# Patient Record
Sex: FEMALE | Race: WHITE | Employment: OTHER | URBAN - METROPOLITAN AREA
[De-identification: names, ages, dates, MRNs, and addresses within clinical notes are randomized per-mention and may not be internally consistent; named-entity substitution may affect disease eponyms.]

---

## 2024-03-13 ENCOUNTER — HOSPITAL ENCOUNTER (OUTPATIENT)
Facility: HOSPITAL | Age: 39
Discharge: HOME | End: 2024-03-16
Payer: OTHER GOVERNMENT

## 2024-03-13 DIAGNOSIS — Z01.818 PRE-OP TESTING: Primary | ICD-10-CM

## 2024-03-13 LAB
ANION GAP SERPL CALC-SCNC: 6 MMOL/L (ref 3–18)
APTT PPP: 30.7 SEC (ref 23–36.4)
BUN SERPL-MCNC: 13 MG/DL (ref 7–18)
BUN/CREAT SERPL: 14 (ref 12–20)
CALCIUM SERPL-MCNC: 9.4 MG/DL (ref 8.5–10.1)
CHLORIDE SERPL-SCNC: 102 MMOL/L (ref 100–111)
CO2 SERPL-SCNC: 31 MMOL/L (ref 21–32)
CREAT SERPL-MCNC: 0.9 MG/DL (ref 0.6–1.3)
EKG ATRIAL RATE: 73 BPM
EKG DIAGNOSIS: NORMAL
EKG P AXIS: 29 DEGREES
EKG P-R INTERVAL: 136 MS
EKG Q-T INTERVAL: 396 MS
EKG QRS DURATION: 84 MS
EKG QTC CALCULATION (BAZETT): 436 MS
EKG R AXIS: 90 DEGREES
EKG T AXIS: 64 DEGREES
EKG VENTRICULAR RATE: 73 BPM
ERYTHROCYTE [DISTWIDTH] IN BLOOD BY AUTOMATED COUNT: 12.3 % (ref 11.6–14.5)
GLUCOSE SERPL-MCNC: 85 MG/DL (ref 74–99)
HCT VFR BLD AUTO: 43.6 % (ref 35–45)
HGB BLD-MCNC: 14.7 G/DL (ref 12–16)
INR PPP: 0.9 (ref 0.9–1.1)
MCH RBC QN AUTO: 30.2 PG (ref 24–34)
MCHC RBC AUTO-ENTMCNC: 33.7 G/DL (ref 31–37)
MCV RBC AUTO: 89.7 FL (ref 78–100)
NRBC # BLD: 0 K/UL (ref 0–0.01)
NRBC BLD-RTO: 0 PER 100 WBC
PLATELET # BLD AUTO: 246 K/UL (ref 135–420)
PMV BLD AUTO: 11.3 FL (ref 9.2–11.8)
POTASSIUM SERPL-SCNC: 4.1 MMOL/L (ref 3.5–5.5)
PROTHROMBIN TIME: 12.7 SEC (ref 11.9–14.7)
RBC # BLD AUTO: 4.86 M/UL (ref 4.2–5.3)
SODIUM SERPL-SCNC: 139 MMOL/L (ref 136–145)
WBC # BLD AUTO: 9.9 K/UL (ref 4.6–13.2)

## 2024-03-13 PROCEDURE — 85730 THROMBOPLASTIN TIME PARTIAL: CPT

## 2024-03-13 PROCEDURE — 93005 ELECTROCARDIOGRAM TRACING: CPT | Performed by: SURGERY

## 2024-03-13 PROCEDURE — 85610 PROTHROMBIN TIME: CPT

## 2024-03-13 PROCEDURE — 80048 BASIC METABOLIC PNL TOTAL CA: CPT

## 2024-03-13 PROCEDURE — 85027 COMPLETE CBC AUTOMATED: CPT

## 2024-03-20 ENCOUNTER — ANESTHESIA EVENT (OUTPATIENT)
Facility: HOSPITAL | Age: 39
End: 2024-03-20
Payer: OTHER GOVERNMENT

## 2024-03-21 ENCOUNTER — HOSPITAL ENCOUNTER (OUTPATIENT)
Facility: HOSPITAL | Age: 39
Setting detail: OUTPATIENT SURGERY
Discharge: HOME | End: 2024-03-21
Attending: SURGERY | Admitting: SURGERY
Payer: OTHER GOVERNMENT

## 2024-03-21 ENCOUNTER — ANESTHESIA (OUTPATIENT)
Facility: HOSPITAL | Age: 39
End: 2024-03-21
Payer: OTHER GOVERNMENT

## 2024-03-21 ENCOUNTER — APPOINTMENT (OUTPATIENT)
Facility: HOSPITAL | Age: 39
End: 2024-03-21
Attending: SURGERY
Payer: OTHER GOVERNMENT

## 2024-03-21 VITALS
HEART RATE: 82 BPM | DIASTOLIC BLOOD PRESSURE: 74 MMHG | RESPIRATION RATE: 15 BRPM | TEMPERATURE: 97.8 F | OXYGEN SATURATION: 100 % | BODY MASS INDEX: 41.59 KG/M2 | HEIGHT: 63 IN | WEIGHT: 234.7 LBS | SYSTOLIC BLOOD PRESSURE: 132 MMHG

## 2024-03-21 DIAGNOSIS — R15.9 FULL INCONTINENCE OF FECES: Primary | ICD-10-CM

## 2024-03-21 LAB — HCG UR QL: NEGATIVE

## 2024-03-21 PROCEDURE — 6360000002 HC RX W HCPCS: Performed by: SURGERY

## 2024-03-21 PROCEDURE — 7100000000 HC PACU RECOVERY - FIRST 15 MIN: Performed by: SURGERY

## 2024-03-21 PROCEDURE — 2580000003 HC RX 258: Performed by: NURSE ANESTHETIST, CERTIFIED REGISTERED

## 2024-03-21 PROCEDURE — 6360000002 HC RX W HCPCS: Performed by: NURSE ANESTHETIST, CERTIFIED REGISTERED

## 2024-03-21 PROCEDURE — C1787 PATIENT PROGR, NEUROSTIM: HCPCS | Performed by: SURGERY

## 2024-03-21 PROCEDURE — C1767 GENERATOR, NEURO NON-RECHARG: HCPCS | Performed by: SURGERY

## 2024-03-21 PROCEDURE — 81025 URINE PREGNANCY TEST: CPT

## 2024-03-21 PROCEDURE — 3700000001 HC ADD 15 MINUTES (ANESTHESIA): Performed by: SURGERY

## 2024-03-21 PROCEDURE — 77002 NEEDLE LOCALIZATION BY XRAY: CPT

## 2024-03-21 PROCEDURE — C1897 LEAD, NEUROSTIM TEST KIT: HCPCS | Performed by: SURGERY

## 2024-03-21 PROCEDURE — 3600000002 HC SURGERY LEVEL 2 BASE: Performed by: SURGERY

## 2024-03-21 PROCEDURE — C1778 LEAD, NEUROSTIMULATOR: HCPCS | Performed by: SURGERY

## 2024-03-21 PROCEDURE — 7100000010 HC PHASE II RECOVERY - FIRST 15 MIN: Performed by: SURGERY

## 2024-03-21 PROCEDURE — 7100000011 HC PHASE II RECOVERY - ADDTL 15 MIN: Performed by: SURGERY

## 2024-03-21 PROCEDURE — 3700000000 HC ANESTHESIA ATTENDED CARE: Performed by: SURGERY

## 2024-03-21 PROCEDURE — 2580000003 HC RX 258: Performed by: SURGERY

## 2024-03-21 PROCEDURE — 3600000012 HC SURGERY LEVEL 2 ADDTL 15MIN: Performed by: SURGERY

## 2024-03-21 PROCEDURE — 7100000001 HC PACU RECOVERY - ADDTL 15 MIN: Performed by: SURGERY

## 2024-03-21 PROCEDURE — 2709999900 HC NON-CHARGEABLE SUPPLY: Performed by: SURGERY

## 2024-03-21 PROCEDURE — 2500000003 HC RX 250 WO HCPCS: Performed by: SURGERY

## 2024-03-21 DEVICE — NEUROSTIMULATOR INTERSTIM X RECHRGE FREE TORQ WRNCH PROD: Type: IMPLANTABLE DEVICE | Site: BACK | Status: FUNCTIONAL

## 2024-03-21 DEVICE — LEAD NERVE STIM 4.32 MM INTERSTIM SURESCAN: Type: IMPLANTABLE DEVICE | Site: BACK | Status: FUNCTIONAL

## 2024-03-21 RX ORDER — ONDANSETRON 2 MG/ML
INJECTION INTRAMUSCULAR; INTRAVENOUS PRN
Status: DISCONTINUED | OUTPATIENT
Start: 2024-03-21 | End: 2024-03-21 | Stop reason: SDUPTHER

## 2024-03-21 RX ORDER — HYDROMORPHONE HYDROCHLORIDE 1 MG/ML
0.5 INJECTION, SOLUTION INTRAMUSCULAR; INTRAVENOUS; SUBCUTANEOUS EVERY 5 MIN PRN
Status: DISCONTINUED | OUTPATIENT
Start: 2024-03-21 | End: 2024-03-21 | Stop reason: HOSPADM

## 2024-03-21 RX ORDER — SODIUM CHLORIDE 0.9 % (FLUSH) 0.9 %
5-40 SYRINGE (ML) INJECTION PRN
Status: DISCONTINUED | OUTPATIENT
Start: 2024-03-21 | End: 2024-03-21 | Stop reason: HOSPADM

## 2024-03-21 RX ORDER — SUCCINYLCHOLINE/SOD CL,ISO/PF 100 MG/5ML
SYRINGE (ML) INTRAVENOUS PRN
Status: DISCONTINUED | OUTPATIENT
Start: 2024-03-21 | End: 2024-03-21 | Stop reason: SDUPTHER

## 2024-03-21 RX ORDER — TRAMADOL HYDROCHLORIDE 50 MG/1
50 TABLET ORAL EVERY 8 HOURS PRN
Qty: 9 TABLET | Refills: 0 | Status: SHIPPED | OUTPATIENT
Start: 2024-03-21 | End: 2024-03-24

## 2024-03-21 RX ORDER — NEOMYCIN AND POLYMYXIN B SULFATES 40; 200000 MG/ML; [USP'U]/ML
SOLUTION IRRIGATION PRN
Status: DISCONTINUED | OUTPATIENT
Start: 2024-03-21 | End: 2024-03-21 | Stop reason: ALTCHOICE

## 2024-03-21 RX ORDER — SODIUM CHLORIDE, SODIUM LACTATE, POTASSIUM CHLORIDE, CALCIUM CHLORIDE 600; 310; 30; 20 MG/100ML; MG/100ML; MG/100ML; MG/100ML
INJECTION, SOLUTION INTRAVENOUS CONTINUOUS
Status: DISCONTINUED | OUTPATIENT
Start: 2024-03-21 | End: 2024-03-21 | Stop reason: HOSPADM

## 2024-03-21 RX ORDER — CHLORHEXIDINE GLUCONATE 4 G/100ML
SOLUTION TOPICAL ONCE
Status: DISCONTINUED | OUTPATIENT
Start: 2024-03-21 | End: 2024-03-21 | Stop reason: HOSPADM

## 2024-03-21 RX ORDER — PROPOFOL 10 MG/ML
INJECTION, EMULSION INTRAVENOUS PRN
Status: DISCONTINUED | OUTPATIENT
Start: 2024-03-21 | End: 2024-03-21 | Stop reason: SDUPTHER

## 2024-03-21 RX ORDER — SODIUM CHLORIDE 9 MG/ML
INJECTION, SOLUTION INTRAVENOUS PRN
Status: DISCONTINUED | OUTPATIENT
Start: 2024-03-21 | End: 2024-03-21 | Stop reason: HOSPADM

## 2024-03-21 RX ORDER — NALOXONE HYDROCHLORIDE 0.4 MG/ML
INJECTION, SOLUTION INTRAMUSCULAR; INTRAVENOUS; SUBCUTANEOUS PRN
Status: DISCONTINUED | OUTPATIENT
Start: 2024-03-21 | End: 2024-03-21 | Stop reason: HOSPADM

## 2024-03-21 RX ORDER — SODIUM CHLORIDE 0.9 % (FLUSH) 0.9 %
5-40 SYRINGE (ML) INJECTION EVERY 12 HOURS SCHEDULED
Status: DISCONTINUED | OUTPATIENT
Start: 2024-03-21 | End: 2024-03-21 | Stop reason: HOSPADM

## 2024-03-21 RX ORDER — SODIUM CHLORIDE, SODIUM LACTATE, POTASSIUM CHLORIDE, CALCIUM CHLORIDE 600; 310; 30; 20 MG/100ML; MG/100ML; MG/100ML; MG/100ML
INJECTION, SOLUTION INTRAVENOUS CONTINUOUS PRN
Status: DISCONTINUED | OUTPATIENT
Start: 2024-03-21 | End: 2024-03-21 | Stop reason: SDUPTHER

## 2024-03-21 RX ORDER — FENTANYL CITRATE 50 UG/ML
INJECTION, SOLUTION INTRAMUSCULAR; INTRAVENOUS PRN
Status: DISCONTINUED | OUTPATIENT
Start: 2024-03-21 | End: 2024-03-21 | Stop reason: SDUPTHER

## 2024-03-21 RX ORDER — MIDAZOLAM HYDROCHLORIDE 1 MG/ML
INJECTION INTRAMUSCULAR; INTRAVENOUS PRN
Status: DISCONTINUED | OUTPATIENT
Start: 2024-03-21 | End: 2024-03-21 | Stop reason: SDUPTHER

## 2024-03-21 RX ORDER — FENTANYL CITRATE 50 UG/ML
25 INJECTION, SOLUTION INTRAMUSCULAR; INTRAVENOUS EVERY 5 MIN PRN
Status: DISCONTINUED | OUTPATIENT
Start: 2024-03-21 | End: 2024-03-21 | Stop reason: HOSPADM

## 2024-03-21 RX ADMIN — FENTANYL CITRATE 100 MCG: 50 INJECTION, SOLUTION INTRAMUSCULAR; INTRAVENOUS at 07:35

## 2024-03-21 RX ADMIN — WATER 2000 MG: 1 INJECTION INTRAMUSCULAR; INTRAVENOUS; SUBCUTANEOUS at 07:57

## 2024-03-21 RX ADMIN — ONDANSETRON HYDROCHLORIDE 4 MG: 2 INJECTION INTRAMUSCULAR; INTRAVENOUS at 08:41

## 2024-03-21 RX ADMIN — PROPOFOL 200 MG: 10 INJECTION, EMULSION INTRAVENOUS at 07:35

## 2024-03-21 RX ADMIN — SODIUM CHLORIDE, SODIUM LACTATE, POTASSIUM CHLORIDE, AND CALCIUM CHLORIDE: 600; 310; 30; 20 INJECTION, SOLUTION INTRAVENOUS at 07:30

## 2024-03-21 RX ADMIN — Medication 100 MG: at 07:35

## 2024-03-21 RX ADMIN — SODIUM CHLORIDE, POTASSIUM CHLORIDE, SODIUM LACTATE AND CALCIUM CHLORIDE: 600; 310; 30; 20 INJECTION, SOLUTION INTRAVENOUS at 06:17

## 2024-03-21 RX ADMIN — MIDAZOLAM 2 MG: 1 INJECTION INTRAMUSCULAR; INTRAVENOUS at 07:30

## 2024-03-21 ASSESSMENT — PAIN SCALES - GENERAL
PAINLEVEL_OUTOF10: 0

## 2024-03-21 ASSESSMENT — PAIN - FUNCTIONAL ASSESSMENT: PAIN_FUNCTIONAL_ASSESSMENT: 0-10

## 2024-03-21 NOTE — ANESTHESIA POSTPROCEDURE EVALUATION
Department of Anesthesiology  Postprocedure Note    Patient: Priscilla David  MRN: [id number]  YOB: 1985  Date of evaluation: 3/21/2024    Procedure Summary       Date: 03/21/24 Room / Location: [facility] MAIN 03 / [facility] MAIN OR    Anesthesia Start: 0730 Anesthesia Stop: 0919    Procedure: FULL IMPLANT USING VERIFY SYSTEM TO INCLUDE INCISION FOR IMPLANTATION OF NERVE STIMULATOR OF THE SACRAL NERVE , INSERTION OF A PERIPHERAL NERVE STIMULATOR PULSE GENERATOR,  ANALYSIS OF PROFESSIONAL COMPONENT OF FLUOROSCOPY UP TO 1 HOUR, ELECTRONIC ANALYSIS OF NEUROSTIMULATOR GENERATOR W.C-ARM (Back) Diagnosis:       Incontinence of feces, unspecified fecal incontinence type      (Incontinence of feces, unspecified fecal incontinence type [R15.9])    Surgeons: Marilu Edgar DO Responsible Provider: Mandy Giordano MD    Anesthesia Type: General ASA Status: 3            Anesthesia Type: General    Josr Phase I: Josr Score: 9    Josr Phase II:      Anesthesia Post Evaluation    Patient location during evaluation: PACU  Patient participation: complete - patient participated  Level of consciousness: awake  Pain score: 0  Airway patency: patent  Nausea & Vomiting: no nausea and no vomiting  Cardiovascular status: blood pressure returned to baseline  Respiratory status: acceptable  Hydration status: stable  Multimodal analgesia pain management approach  Pain management: satisfactory to patient    No notable events documented.

## 2024-03-21 NOTE — PERIOP NOTE
TRANSFER - IN REPORT:    Verbal report received from BHUMIKA Menjivar on Priscilla David  being received from PACU for routine progression of patient care      Report consisted of patient's Situation, Background, Assessment and   Recommendations(SBAR).     Information from the following report(s) Intake/Output and MAR was reviewed with the receiving nurse.    Opportunity for questions and clarification was provided.      Assessment completed upon patient's arrival to unit and care assumed.

## 2024-03-21 NOTE — PROGRESS NOTES
PACU FAMILY UPDATE    Called pt boyfriend Mr. Dempsey and discussed the findings and the conduct of the case as well as post-op course expectations.  All questions were answered.

## 2024-03-21 NOTE — PERIOP NOTE
TRANSFER - OUT REPORT:    Verbal report given to BHUMIKA Carbone on Priscilla David  being transferred to [facility] for routine post-op       Report consisted of patient's Situation, Background, Assessment and   Recommendations(SBAR).     Information from the following report(s) Adult Overview, Surgery Report, Intake/Output, and MAR was reviewed with the receiving nurse.           Lines:   Peripheral IV 03/21/24 Left;Posterior Hand (Active)   Site Assessment Clean, dry & intact 03/21/24 0925   Line Status Infusing 03/21/24 0925   Line Care Connections checked and tightened 03/21/24 0925   Phlebitis Assessment No symptoms 03/21/24 0925   Infiltration Assessment 0 03/21/24 0925   Alcohol Cap Used No 03/21/24 0925   Dressing Status Clean, dry & intact 03/21/24 0925   Dressing Type Transparent 03/21/24 0925        Opportunity for questions and clarification was provided.      Patient transported with:  Registered Nurse

## 2024-03-21 NOTE — OP NOTE
OPERATIVE NOTE    Patient: Priscilla David MRN: [id number]  SSN: [id number]    YOB: 1985  Age: 39 y.o.  Sex: female      Indications: This is a 39 y.o. year-old female who presents with fecal incontinence. The patient was admitted for surgery as conservative measures have failed. The patient has had fecal incontinence for a number of years. The patient has palpable evidence of a scar indicating a defect of less than 90° in the anterior midline. Preoperative evaluation included failure of medications and lifestyle and behavior modifications. The patient had a trial PNE testing implant placed, which showed greater than 50% improvement in the patient's symptoms on pre and post bowel diaries. Therefore the patient will now undergo permanent implantation due to measurement demonstrated of favorable response for the test trial.     Date of Procedure: 3/21/2024     Preoperative Diagnosis: Pre-Op Diagnosis Codes:     * Incontinence of feces, unspecified fecal incontinence type [R15.9]    Postoperative Diagnosis: Post-Op Diagnosis Codes:     * Incontinence of feces, unspecified fecal incontinence type [R15.9]      Surgeon(s): Surgeon(s) and Role:     * Marilu Edgar DO - Primary    Assistant(s): Circulator: Triny Turner RN  Surgical Assistant: Nicole Encarnacion  Radiology Technologist: Lottie Roper Circulator: Marsha Tay RN  Scrub Person First: Shira Kay    Anesthesia: General     Procedure:   1. Full implant using verify system to include incision for implantation of nerve stimulator of the sacral nerve, CPT 71594. MRI COMPATIBLE  2. Insertion of a peripheral nerve stimulator pulse generator, CPT 32897. NON-RECHARGEABLE  3. Analysis of professional component of fluoroscopy up to 1 hour, CPT 51086-21.  4. Electronic analysis of implanted neurostimulator pulse generator system, CPT 36432.      Procedure:   After obtaining informed consent the patient was properly identified and

## 2024-03-21 NOTE — ANESTHESIA PRE PROCEDURE
Department of Anesthesiology  Preprocedure Note       Name:  Priscilla David   Age:  39 y.o.  :  1985                                          MRN:  [id number]         Date:  3/21/2024      Surgeon: Surgeon(s):  Marilu Edgar DO    Procedure: Procedure(s):  FULL IMPLANT USING VERIFY SYSTEM TO INCLUDE INCISION FOR IMPLANTATION OF NERVE STIMULATOR OF THE SACRAL NERVE , INSERTION OF A PERIPHERAL NERVE STIMULATOR PULSE GENERATOR,  ANALYSIS OF PROFESSIONAL COMPONENT OF FLUOROSCOPY UP TO 1 HOUR, ELECTRONIC ANALYSIS OF NEUROSTIMULATOR GENERATOR W.C-ARM    Medications prior to admission:   Prior to Admission medications    Medication Sig Start Date End Date Taking? Authorizing Provider   albuterol sulfate HFA (PROVENTIL;VENTOLIN;PROAIR) 108 (90 Base) MCG/ACT inhaler Inhale 2 puffs into the lungs as needed for Wheezing or Shortness of Breath 10/4/23 10/3/24  Zenobia Chatterjee MD   budesonide (PULMICORT) 0.5 MG/2ML nebulizer suspension Take 2 mLs by nebulization 2 times daily 10/26/23   Zenobia Chatterjee MD   cetirizine (ZYRTEC) 10 MG tablet Take 1 tablet by mouth daily 6/22/23 10/3/24  Zenobia Chatterjee MD   esomeprazole (NEXIUM) 40 MG delayed release capsule Take 1 capsule by mouth every morning (before breakfast) 3/13/23   Zenobia Chatterjee MD   famotidine (PEPCID) 20 MG tablet Take 1 tablet by mouth 2 times daily 9/4/23 9/3/24  Zenobia Chatterjee MD   FLUoxetine (PROZAC) 20 MG capsule Take 2 capsules by mouth daily 1/3/24   Zenobia Chatterjee MD   fluticasone (FLONASE) 50 MCG/ACT nasal spray 2 sprays by Nasal route in the morning and at bedtime 23  Zenobia Chatterjee MD   hydrOXYzine HCl (ATARAX) 25 MG tablet Take 1 tablet by mouth nightly as needed 23  Zenobia Chatterjee MD   ipratropium (ATROVENT) 0.03 % nasal spray 2 sprays by Nasal route 3 times daily 1/19/24 1/3/25  Zenobia Chatterjee MD   CONCERTA 54 MG extended release tablet  10/20/23 6/10/24

## 2024-03-21 NOTE — DISCHARGE INSTRUCTIONS
experience any redness or discharge or sign of infection.  Persistent nausea lasting more than 24 hours.    If you are unable to reach your Surgeon for any of the symptoms above, you should proceed directly to the nearest Emergency Department.    Post-Operative Appointment Information    Call Dr. Edgar's office tomorrow morning at [phone] to schedule a post-operative office visit in two (2) weeks.    If any questions or concerns arise, call your Surgeon at [phone].     DISCHARGE SUMMARY from Nurse    PATIENT INSTRUCTIONS:    After general anesthesia or intravenous sedation, for 24 hours or while taking prescription Narcotics:  Limit your activities  Do not drive and operate hazardous machinery  Do not make important personal or business decisions  Do  not drink alcoholic beverages  If you have not urinated within 8 hours after discharge, please contact your surgeon on call.    Report the following to your surgeon:  Excessive pain, swelling, redness or odor of or around the surgical area  Temperature over 100.5  Nausea and vomiting lasting longer than 4 hours or if unable to take medications  Any signs of decreased circulation or nerve impairment to extremity: change in color, persistent  numbness, tingling, coldness or increase pain  Any questions    What to do at Home:  Recommended activity: ambulate in house and no lifting, Driving, or Strenuous exercise until advised    If you experience any of the following symptoms above, please follow up with Dr. Edgar.    *  Please give a list of your current medications to your Primary Care Provider.    *  Please update this list whenever your medications are discontinued, doses are      changed, or new medications (including over-the-counter products) are added.    *  Please carry medication information at all times in case of emergency situations.    These are general instructions for a healthy lifestyle:    No smoking/ No tobacco products/ Avoid

## 2024-03-21 NOTE — H&P
History & Physical    Patient: Priscilla David MRN: [id number]  CSN: [id number]    YOB: 1985  Age: 39 y.o.  Sex: female      DOA: 3/21/2024       HPI:     Priscilla David is a 39 y.o. female who has fecal incontinence.    Past Medical History:   Diagnosis Date    Anxiety and depression     takes prozac    Arthritis     Asthma     on inhalers    Chronic pain     lower legs and feet    GERD (gastroesophageal reflux disease)     on meds    Hearing loss     right ear partial hearing loss has hearing aid but cant wear  due to job    Hiatal hernia     Obesity     KRISHAN (obstructive sleep apnea)     Restless legs     Retention of urine     urinary incontinence  and rectal incontience    Sleep apnea     use cpap    Wears glasses     reading and watching TV       Past Surgical History:   Procedure Laterality Date    BREAST SURGERY Bilateral 2011    implants    BREAST SURGERY Bilateral 2022    lift    COLONOSCOPY      ESOPHAGOGASTRODUODENOSCOPY      SINUS SURGERY  2016    SKIN BIOPSY  2009    WISDOM TOOTH EXTRACTION         History reviewed. No pertinent family history.    Social History     Socioeconomic History    Marital status: Single     Spouse name: None    Number of children: None    Years of education: None    Highest education level: None   Tobacco Use    Smoking status: Some Days     Types: Cigars    Smokeless tobacco: Never   Vaping Use    Vaping Use: Never used   Substance and Sexual Activity    Alcohol use: Yes     Alcohol/week: 1.0 standard drink of alcohol     Types: 1 Glasses of wine per week     Comment: rarely , very social    Drug use: Never    Sexual activity: Yes     Partners: Male       Prior to Admission medications    Medication Sig Start Date End Date Taking? Authorizing Provider   albuterol sulfate HFA (PROVENTIL;VENTOLIN;PROAIR) 108 (90 Base) MCG/ACT inhaler Inhale 2 puffs into the lungs as needed for Wheezing or Shortness of Breath 10/4/23 10/3/24  Provider, MD Zenobia

## 2024-03-21 NOTE — BRIEF OP NOTE
Brief Postoperative Note      Patient: Priscilla David  YOB: 1985  MRN: [id number]    Date of Procedure: 3/21/2024    Pre-Op Diagnosis Codes:     * Incontinence of feces, unspecified fecal incontinence type [R15.9]    Post-Op Diagnosis: Post-Op Diagnosis Codes:     * Incontinence of feces, unspecified fecal incontinence type [R15.9]       Procedure(s):  FULL IMPLANT USING VERIFY SYSTEM TO INCLUDE INCISION FOR IMPLANTATION OF NERVE STIMULATOR OF THE SACRAL NERVE , INSERTION OF A PERIPHERAL NERVE STIMULATOR PULSE GENERATOR,  ANALYSIS OF PROFESSIONAL COMPONENT OF FLUOROSCOPY UP TO 1 HOUR, ELECTRONIC ANALYSIS OF NEUROSTIMULATOR GENERATOR W.C-ARM    Surgeon(s):  Marilu Edgar DO    Assistant:  Surgical Assistant: Nicole Encarnacion    Anesthesia: General    Estimated Blood Loss (mL): Minimal    Complications: None    Specimens:   * No specimens in log *    Implants:  Implant Name Type Inv. Item Serial No.  Lot No. LRB No. Used Action   NEUROSTIMULATOR INTERSTIM X RECHRGE FREE TORQ WRNCH PROD - [id number]  NEUROSTIMULATOR INTERSTIM X RECHRGE FREE TORQ WRNCH PROD [id number] [employer]WD  N/A 1 Implanted   LEAD NERVE STIM 4.32 MM INTERSTIM SURESCAN - [id number]  LEAD NERVE STIM 4.32 MM INTERSTIM SURESCAN  [employer]WD [id number] Left 1 Implanted   NEUROSTIMULATOR INTERSTIM X RECHRGE FREE TORQ WRNCH PROD - [id number]  NEUROSTIMULATOR INTERSTIM X RECHRGE FREE TORQ WRNCH PROD [id number] [employer]WD  Left 1 Implanted         Drains: * No LDAs found *    Findings: good abhilash left S3      Electronically signed by Marilu Edgar DO on 3/21/2024 at 9:33 AM

## 2024-03-21 NOTE — DISCHARGE SUMMARY
Take 2 mLs by nebulization 2 times daily      cetirizine (ZYRTEC) 10 MG tablet Take 1 tablet by mouth daily      esomeprazole (NEXIUM) 40 MG delayed release capsule Take 1 capsule by mouth every morning (before breakfast)      famotidine (PEPCID) 20 MG tablet Take 1 tablet by mouth 2 times daily      FLUoxetine (PROZAC) 20 MG capsule Take 2 capsules by mouth daily      fluticasone (FLONASE) 50 MCG/ACT nasal spray 2 sprays by Nasal route in the morning and at bedtime      hydrOXYzine HCl (ATARAX) 25 MG tablet Take 1 tablet by mouth nightly as needed      ipratropium (ATROVENT) 0.03 % nasal spray 2 sprays by Nasal route 3 times daily      CONCERTA 54 MG extended release tablet       pramipexole (MIRAPEX) 0.125 MG tablet Take 1 tablet by mouth nightly      valACYclovir (VALTREX) 1 g tablet Take 1 tablet by mouth daily      Multiple Vitamins-Minerals (THERAPEUTIC MULTIVITAMIN-MINERALS) tablet Take 1 tablet by mouth daily             Activity: activity as tolerated and no driving for today, no driving while on analgesics, and no shower until Saturday    Diet: regular diet    Wound Care: keep wound clean and dry, ice to area for comfort, as directed, and no pressure on wound for 2 weeks    Follow-up: as scheduled

## 2024-07-21 NOTE — PERIOP NOTE
D/c instructions gone over with pt. All questions/concerns addressed at this time. Pt wife on way, will be assisted via w/c when she is out front.    Discharge instructions given to patient and caregiver. Written instructions given to take home. Verbal understanding given by patient and caregiver. ID band removed before leaving.

## (undated) DEVICE — SUTURE MCRYL + SZ 4-0 L27IN ABSRB UD L19MM PS-2 3/8 CIR MCP426H

## (undated) DEVICE — SOLUTION IRRIG 500ML 0.9% SOD CHLO USP POUR PLAS BTL

## (undated) DEVICE — NEEDLE 25GA X 1.5 IN ECLIPSE

## (undated) DEVICE — DRAPE C ARM UNIV W41XL74IN CLR PLAS XR VELC CLSR POLY STRP

## (undated) DEVICE — MINOR: Brand: MEDLINE INDUSTRIES, INC.

## (undated) DEVICE — GLOVE SURG SZ 7 L12IN FNGR THK79MIL GRN LTX FREE

## (undated) DEVICE — Device

## (undated) DEVICE — SUTURE VCRL SZ 3-0 L27IN ABSRB UD L26MM SH 1/2 CIR J416H

## (undated) DEVICE — NEUROSTIMULATOR EXT SM LTWT SGL BTTN H2O RESIST WIRELESS

## (undated) DEVICE — STERILE POLYISOPRENE POWDER-FREE SURGICAL GLOVES: Brand: PROTEXIS

## (undated) DEVICE — LIQUIBAND RAPID ADHESIVE 36/CS 0.8ML: Brand: MEDLINE

## (undated) DEVICE — GLOVE SURG SZ 65 THK91MIL LTX FREE SYN POLYISOPRENE

## (undated) DEVICE — SHEET,DRAPE,40X58,STERILE: Brand: MEDLINE

## (undated) DEVICE — PROGRAMMER SMART COMM W/HANDSET F/SACRAL NRVE STIMULATORS

## (undated) DEVICE — SOLUTION IRRIG 1500ML STRL H2O USP POUR PLAS BTL

## (undated) DEVICE — 3M™ IOBAN™ 2 ANTIMICROBIAL INCISE DRAPE 6650EZ: Brand: IOBAN™ 2

## (undated) DEVICE — INTENDED FOR TISSUE SEPARATION, AND OTHER PROCEDURES THAT REQUIRE A SHARP SURGICAL BLADE TO PUNCTURE OR CUT.: Brand: BARD-PARKER SAFETY BLADES SIZE 11, STERILE

## (undated) DEVICE — SPONGE GZ W4XL4IN COT 12 PLY TYP VII WVN C FLD DSGN STERILE

## (undated) DEVICE — GARMENT,MEDLINE,DVT,INT,CALF,MED, GEN2: Brand: MEDLINE

## (undated) DEVICE — C-ARMOR C-ARM EQUIPMENT COVERS CLEAR STERILE UNIVERSAL FIT 12 PER CASE: Brand: C-ARMOR